# Patient Record
(demographics unavailable — no encounter records)

---

## 2024-11-23 NOTE — HISTORY OF PRESENT ILLNESS
[FreeTextEntry1] : Follow up on labs [de-identified] : Pt here for follow up. Lives with wife and two sons.  Both young boys on autism spectrum. c/o sinus problems and allergies.  Feels stable on his current medications/regimen. c/o hyponogognia.  Sometimes get sleep paralysis when he first goes to sleep.  Often from sleep deprivation.  Maybe other triggers. Gets aural type symptoms (usually related to caffeine or sinus infections/symptoms).  Had seen neuro back in 2018. Told that he may have a seizure disorder.  He doesn't think so and has sought other opinions.  Was told by other doctors that seizures aren't the cause.  He feels stable and declines further workup. Also feels like his asthma is under control.  Stable.  Uses albuterol prn and it works well.  Triggered by infections. Wants to test cholesterol today.  Had started running for exercise earlier the summer but stopped due to his difficult schedul.  Food choices not the best but has stopped ice cream.  Some red meat. Aware of prediabetes. Did get COVID shots in past.  Not interested at this time. Ongoing constipation . Uses prune juice and apricots.  Helps.  Feels stable.

## 2024-11-23 NOTE — HEALTH RISK ASSESSMENT
[Audit-CScore] : 0 [[id number]] : 0 [Change in mental status noted] : No change in mental status noted [High Risk Behavior] : no high risk behavior

## 2024-11-23 NOTE — HISTORY OF PRESENT ILLNESS
[FreeTextEntry1] : Follow up on labs [de-identified] : Pt here for follow up. Lives with wife and two sons.  Both young boys on autism spectrum. c/o sinus problems and allergies.  Feels stable on his current medications/regimen. c/o hyponogognia.  Sometimes get sleep paralysis when he first goes to sleep.  Often from sleep deprivation.  Maybe other triggers. Gets aural type symptoms (usually related to caffeine or sinus infections/symptoms).  Had seen neuro back in 2018. Told that he may have a seizure disorder.  He doesn't think so and has sought other opinions.  Was told by other doctors that seizures aren't the cause.  He feels stable and declines further workup. Also feels like his asthma is under control.  Stable.  Uses albuterol prn and it works well.  Triggered by infections. Wants to test cholesterol today.  Had started running for exercise earlier the summer but stopped due to his difficult schedul.  Food choices not the best but has stopped ice cream.  Some red meat. Aware of prediabetes. Did get COVID shots in past.  Not interested at this time. Ongoing constipation . Uses prune juice and apricots.  Helps.  Feels stable.

## 2024-11-23 NOTE — HEALTH RISK ASSESSMENT
[Audit-CScore] : 0 [[id number]] : 0 [Change in mental status noted] : No change in mental status noted [High Risk Behavior] : no high risk behavior

## 2024-11-23 NOTE — ASSESSMENT
[FreeTextEntry1] : We reviewed the fact that patient is Hep A non immune on labs.  I highly recommend getting the Hep A series and starting it today.  He recollects that he had the Hep A series in the last few years and is ambivalent about getting it again. He agrees to look for older records. I recommend COVID and flu shots.